# Patient Record
Sex: FEMALE | NOT HISPANIC OR LATINO | Employment: PART TIME | URBAN - METROPOLITAN AREA
[De-identification: names, ages, dates, MRNs, and addresses within clinical notes are randomized per-mention and may not be internally consistent; named-entity substitution may affect disease eponyms.]

---

## 2023-10-09 ENCOUNTER — LAB (OUTPATIENT)
Dept: LAB | Facility: HOSPITAL | Age: 28
End: 2023-10-09
Payer: COMMERCIAL

## 2023-10-09 ENCOUNTER — OFFICE VISIT (OUTPATIENT)
Dept: GASTROENTEROLOGY | Facility: CLINIC | Age: 28
End: 2023-10-09
Payer: COMMERCIAL

## 2023-10-09 VITALS
WEIGHT: 271.8 LBS | HEIGHT: 60 IN | BODY MASS INDEX: 53.36 KG/M2 | SYSTOLIC BLOOD PRESSURE: 118 MMHG | DIASTOLIC BLOOD PRESSURE: 82 MMHG

## 2023-10-09 DIAGNOSIS — R10.11 RUQ PAIN: ICD-10-CM

## 2023-10-09 DIAGNOSIS — R79.89 ELEVATED LFTS: Primary | ICD-10-CM

## 2023-10-09 DIAGNOSIS — R79.89 ELEVATED LFTS: ICD-10-CM

## 2023-10-09 LAB
CERULOPLASMIN SERPL-MCNC: 30 MG/DL (ref 19–39)
HBV SURFACE AG SERPL QL IA: NORMAL
HCV AB SER DONR QL: NORMAL
IRON 24H UR-MRATE: 95 MCG/DL (ref 37–145)
IRON SATN MFR SERPL: 22 % (ref 20–50)
TIBC SERPL-MCNC: 432 MCG/DL (ref 298–536)
TRANSFERRIN SERPL-MCNC: 290 MG/DL (ref 200–360)

## 2023-10-09 PROCEDURE — 82784 ASSAY IGA/IGD/IGG/IGM EACH: CPT

## 2023-10-09 PROCEDURE — 36415 COLL VENOUS BLD VENIPUNCTURE: CPT

## 2023-10-09 PROCEDURE — 86803 HEPATITIS C AB TEST: CPT

## 2023-10-09 PROCEDURE — 83010 ASSAY OF HAPTOGLOBIN QUANT: CPT

## 2023-10-09 PROCEDURE — 82390 ASSAY OF CERULOPLASMIN: CPT

## 2023-10-09 PROCEDURE — 82247 BILIRUBIN TOTAL: CPT

## 2023-10-09 PROCEDURE — 83883 ASSAY NEPHELOMETRY NOT SPEC: CPT

## 2023-10-09 PROCEDURE — 82172 ASSAY OF APOLIPOPROTEIN: CPT

## 2023-10-09 PROCEDURE — 82947 ASSAY GLUCOSE BLOOD QUANT: CPT

## 2023-10-09 PROCEDURE — 82103 ALPHA-1-ANTITRYPSIN TOTAL: CPT

## 2023-10-09 PROCEDURE — 82977 ASSAY OF GGT: CPT

## 2023-10-09 PROCEDURE — 84450 TRANSFERASE (AST) (SGOT): CPT

## 2023-10-09 PROCEDURE — 99204 OFFICE O/P NEW MOD 45 MIN: CPT

## 2023-10-09 PROCEDURE — 87340 HEPATITIS B SURFACE AG IA: CPT

## 2023-10-09 PROCEDURE — 86381 MITOCHONDRIAL ANTIBODY EACH: CPT

## 2023-10-09 PROCEDURE — 82785 ASSAY OF IGE: CPT

## 2023-10-09 PROCEDURE — 84478 ASSAY OF TRIGLYCERIDES: CPT

## 2023-10-09 PROCEDURE — 82104 ALPHA-1-ANTITRYPSIN PHENO: CPT

## 2023-10-09 PROCEDURE — 83013 H PYLORI (C-13) BREATH: CPT

## 2023-10-09 PROCEDURE — 84466 ASSAY OF TRANSFERRIN: CPT

## 2023-10-09 PROCEDURE — 84460 ALANINE AMINO (ALT) (SGPT): CPT

## 2023-10-09 PROCEDURE — 82465 ASSAY BLD/SERUM CHOLESTEROL: CPT

## 2023-10-09 PROCEDURE — 83540 ASSAY OF IRON: CPT

## 2023-10-09 NOTE — PROGRESS NOTES
PATIENT INFORMATION  Agnes Penny       - 1995    CHIEF COMPLAINT  Chief Complaint   Patient presents with    Elevated Hepatic Enzymes       HISTORY OF PRESENT ILLNESS  Here today for evaluation of LFTs,    2023 cholecystectomy, Tuesday after labor day. Liver enzymes were elevated and imaging consistent with fatty liver, RUQ pain wrapping around. Not sure if stones.    Elevated LFTs, AST. Difficulty with weight loss, exercising and now up 4 lbs. Reviewed tracking calories. Fasting BG elevated so awaiting A1C. Recent labs comparable to those during [place].    RUQ pain better as long as avoiding greasy foods and red meats. No nausea, vomiting, dysphagia, bloating, belching. NO NSAIDS. Never treated for HP.    Weaned self off zoloft, buspar and lisinopril.      REVIEWED PERTINENT RESULTS/ LABS  Lab Results   Component Value Date    CASEREPORT  2019     Surgical Pathology Report                         Case: [id number]                                  Authorizing Provider:  Julianna Daugherty DO       Collected:           2019 09:49 AM          Ordering Location:     [facility]   Received:            2019 11:00 AM                                 OR                                                                           Pathologist:           Vicente Hammer MD                                                         Specimens:   1) - Fallopian Tube, Left                                                                           2) - Fallopian Tube, Right                                                                 FINALDX  2019     1.  Fallopian Tube, Left, Salpingectomy:  Complete cross section of benign fallopian tube.      2.  Fallopian Tube, Right, Salpingectomy:  Complete cross section of benign fallopian tube.      nav/bb        Lab Results   Component Value Date    HGB 14.9 10/23/2019    MCV 89 10/23/2019     10/23/2019    HGBA1C 4.80 05/15/2018      No  results found.    REVIEW OF SYSTEMS  Review of Systems   Constitutional:  Positive for fatigue.   HENT: Negative.     Eyes: Negative.    Respiratory: Negative.     Cardiovascular: Negative.    Gastrointestinal:  Negative for abdominal pain, constipation, diarrhea, nausea and vomiting.   Endocrine: Negative.    Genitourinary: Negative.    Musculoskeletal: Negative.    Skin: Negative.    Allergic/Immunologic: Negative.    Neurological: Negative.    Hematological: Negative.    Psychiatric/Behavioral: Negative.           ACTIVE PROBLEMS  Patient Active Problem List    Diagnosis     Nexplanon removal [Z30.46]     Request for sterilization [Z30.2]     Engagement of fetus in breech position [O32.1XX0]     ASCUS with positive high risk HPV cervical [R87.610, R87.810]     Family history of Downs syndrome [Z82.79]     Increased BMI [R63.8]          PAST MEDICAL HISTORY  Past Medical History:   Diagnosis Date    Carpal tunnel syndrome     PONV (postoperative nausea and vomiting)     Sciatic leg pain     RIGHT         SURGICAL HISTORY  Past Surgical History:   Procedure Laterality Date    ADENOIDECTOMY       SECTION N/A 2018    Procedure:  SECTION PRIMARY;  Surgeon: Julianna Daugherty DO;  Location:  [facility];  Service: Obstetrics/Gynecology    CHOLECYSTECTOMY      EAR TUBES      SALPINGECTOMY Bilateral 2019    Procedure: SALPINGECTOMY LAPAROSCOPIC;  Surgeon: Julianna Daugherty DO;  Location: [facility] OR;  Service: Obstetrics/Gynecology    TONSILLECTOMY           FAMILY HISTORY  Family History   Problem Relation Age of Onset    COPD Father     Pulmonary embolism Father     Heart disease Father 46    Colon cancer Father     Heart attack Father 46    Hypertension Father     Deep vein thrombosis Father     Fibromyalgia Mother     Diabetes Mother     COPD Mother     Bipolar disorder Mother     Transient ischemic attack Mother     Hypertension Mother     No Known Problems Brother     Diabetes  "Sister     Lung cancer Paternal Grandfather     Aneurysm Paternal Grandmother     Heart attack Maternal Grandfather 42    No Known Problems Sister     Sudden death Maternal Uncle          SOCIAL HISTORY  Social History     Occupational History    Not on file   Tobacco Use    Smoking status: Former     Passive exposure: Past    Smokeless tobacco: Never    Tobacco comments:     quit 6 years ago   Vaping Use    Vaping Use: Never used   Substance and Sexual Activity    Alcohol use: Yes     Comment: occasionally    Drug use: No    Sexual activity: Defer     Partners: Male     Birth control/protection: OCP, Nexplanon         CURRENT MEDICATIONS  No current outpatient medications on file.    ALLERGIES  Lortab [hydrocodone-acetaminophen]    VITALS  Vitals:    10/09/23 0812   BP: 118/82   BP Location: Left arm   Patient Position: Sitting   Cuff Size: Adult   Weight: 123 kg (271 lb 12.8 oz)   Height: 152.4 cm (60\")       PHYSICAL EXAM  Debilities/Disabilities Identified: None  Emotional Behavior: Appropriate  Wt Readings from Last 3 Encounters:   10/09/23 123 kg (271 lb 12.8 oz)   10/23/19 116 kg (255 lb 14.4 oz)   05/09/19 117 kg (257 lb)     Ht Readings from Last 1 Encounters:   10/09/23 152.4 cm (60\")     Body mass index is 53.08 kg/mý.  Physical Exam  Constitutional:       General: She is not in acute distress.     Appearance: Normal appearance. She is not ill-appearing.   HENT:      Head: Normocephalic and atraumatic.      Mouth/Throat:      Mouth: Mucous membranes are moist.      Pharynx: No posterior oropharyngeal erythema.   Eyes:      General: No scleral icterus.  Cardiovascular:      Rate and Rhythm: Normal rate and regular rhythm.      Heart sounds: Normal heart sounds.   Pulmonary:      Effort: Pulmonary effort is normal.      Breath sounds: Normal breath sounds.   Abdominal:      General: Abdomen is flat. Bowel sounds are normal. There is no distension.      Palpations: Abdomen is soft. There is no mass.      " Tenderness: There is no abdominal tenderness in the epigastric area and periumbilical area. There is no guarding or rebound. Negative signs include Barnes's sign.      Hernia: No hernia is present.   Musculoskeletal:      Cervical back: Neck supple.   Skin:     General: Skin is warm.      Capillary Refill: Capillary refill takes less than 2 seconds.   Neurological:      General: No focal deficit present.      Mental Status: She is alert and oriented to person, place, and time.   Psychiatric:         Mood and Affect: Mood normal.         Behavior: Behavior normal.         Thought Content: Thought content normal.         Judgment: Judgment normal.         CLINICAL DATA REVIEWED   reviewed previous lab results and integrated with today's visit, reviewed notes from other physicians and/or last GI encounter, reviewed previous endoscopy results and available photos, reviewed surgical pathology results from previous biopsies    ASSESSMENT  Diagnoses and all orders for this visit:    Elevated LFTs  -     Iron Profile  -     Alpha - 1 - Antitrypsin Phenotype  -     Mitochondrial Antibodies, M2  -     OBRIEN Fibrosure  -     Hepatitis C Antibody  -     Ceruloplasmin  -     Immunoglobulins Quant; Future  -     Hepatitis B Surface Antigen    RUQ pain  -     H. Pylori Breath Test - Breath, Lung; Future          PLAN    Liver workup  HP  Call if return RUQ discomfort    Return in about 3 months (around 1/9/2024).    I have discussed the above plan with the patient.  They verbalize understanding and are in agreement with the plan.  They have been advised to contact the office for any questions, concerns, or changes related to their health.

## 2023-10-10 LAB
A2 MACROGLOB SERPL-MCNC: 232 MG/DL (ref 110–276)
ALT SERPL W P-5'-P-CCNC: 79 IU/L (ref 0–40)
APO A-I SERPL-MCNC: 124 MG/DL (ref 116–209)
AST SERPL W P-5'-P-CCNC: 73 IU/L (ref 0–40)
BILIRUB SERPL-MCNC: 0.5 MG/DL (ref 0–1.2)
CHOLEST SERPL-MCNC: 232 MG/DL (ref 100–199)
FIBROSIS SCORING:: ABNORMAL
FIBROSIS STAGE SERPL QL: ABNORMAL
GGT SERPL-CCNC: 63 IU/L (ref 0–60)
GLUCOSE SERPL-MCNC: 120 MG/DL (ref 70–99)
HAPTOGLOB SERPL-MCNC: 143 MG/DL (ref 33–278)
LABORATORY COMMENT REPORT: ABNORMAL
LIVER FIBR SCORE SERPL CALC.FIBROSURE: 0.2 (ref 0–0.21)
LIVER STEATOSIS GRADE SERPL QL: ABNORMAL
LIVER STEATOSIS SCORE SERPL: 0.69 (ref 0–0.4)
MITOCHONDRIA M2 IGG SER-ACNC: <20 UNITS (ref 0–20)
NASH GRADE SERPL QL: ABNORMAL
NASH INTERPRETATION SERPL-IMP: ABNORMAL
NASH SCORE SERPL: 0.63 (ref 0–0.25)
NASH SCORING: ABNORMAL
STEATOSIS SCORING: ABNORMAL
TEST PERFORMANCE INFO SPEC: ABNORMAL
TEST PERFORMANCE INFO SPEC: ABNORMAL
TRIGL SERPL-MCNC: 185 MG/DL (ref 0–149)
UREA BREATH TEST QL: NEGATIVE

## 2023-10-12 LAB
IGA SERPL-MCNC: 257 MG/DL (ref 87–352)
IGE SERPL-ACNC: 411 IU/ML (ref 6–495)
IGG SERPL-MCNC: 1187 MG/DL (ref 586–1602)
IGM SERPL-MCNC: 127 MG/DL (ref 26–217)

## 2023-10-16 LAB
A1AT PHENOTYP SERPL IFE: ABNORMAL
A1AT SERPL-MCNC: 93 MG/DL (ref 100–188)

## 2023-10-17 ENCOUNTER — TELEPHONE (OUTPATIENT)
Dept: GASTROENTEROLOGY | Facility: CLINIC | Age: 28
End: 2023-10-17
Payer: COMMERCIAL

## 2023-10-17 NOTE — TELEPHONE ENCOUNTER
Called pt Alpha 1 resulted last night pt wanted to review  Waiting for provider to result pt verbalized understanding

## 2023-10-18 NOTE — TELEPHONE ENCOUNTER
Called and reviewed with patient. Father also with A1A gene and severe COPD. Reviewed to avoid smoking as she is at increased risk. Answered pt questions and verbalized understanding.